# Patient Record
Sex: FEMALE | Race: BLACK OR AFRICAN AMERICAN | URBAN - METROPOLITAN AREA
[De-identification: names, ages, dates, MRNs, and addresses within clinical notes are randomized per-mention and may not be internally consistent; named-entity substitution may affect disease eponyms.]

---

## 2017-05-16 ENCOUNTER — PATIENT OUTREACH (OUTPATIENT)
Dept: FAMILY MEDICINE CLINIC | Age: 57
End: 2017-05-16

## 2017-05-16 NOTE — PROGRESS NOTES
Patient listed on hospital discharge (Daily Census) report. On 5/15/2017, patient presented to the [facility] ED. Attempted to contact patient in efforts to schedule an appointment to address ED visit and gaps in care. Unable to reach patient by phone, However I was able to leave a voice message advising to call the office to facilitate a follow up.

## 2017-07-21 ENCOUNTER — PATIENT OUTREACH (OUTPATIENT)
Dept: FAMILY MEDICINE CLINIC | Age: 57
End: 2017-07-21

## 2017-07-21 NOTE — PROGRESS NOTES
Closed Transitions of Care Coordination Episode which was initiated a post ED visit over 30 days ago. Patient has not re-visited the emergency room since episode was initiated.